# Patient Record
Sex: MALE | Race: WHITE | NOT HISPANIC OR LATINO | URBAN - METROPOLITAN AREA
[De-identification: names, ages, dates, MRNs, and addresses within clinical notes are randomized per-mention and may not be internally consistent; named-entity substitution may affect disease eponyms.]

---
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
## 2022-01-23 ENCOUNTER — HOSPITAL ENCOUNTER (EMERGENCY)
Facility: MEDICAL CENTER | Age: 22
End: 2022-01-23
Payer: COMMERCIAL

## 2025-01-23 ENCOUNTER — HOSPITAL ENCOUNTER (OUTPATIENT)
Dept: RADIOLOGY | Facility: MEDICAL CENTER | Age: 25
End: 2025-01-23
Attending: FAMILY MEDICINE
Payer: COMMERCIAL

## 2025-01-23 ENCOUNTER — OFFICE VISIT (OUTPATIENT)
Dept: URGENT CARE | Facility: PHYSICIAN GROUP | Age: 25
End: 2025-01-23
Payer: COMMERCIAL

## 2025-01-23 VITALS
SYSTOLIC BLOOD PRESSURE: 118 MMHG | OXYGEN SATURATION: 98 % | BODY MASS INDEX: 29.56 KG/M2 | HEIGHT: 66 IN | HEART RATE: 106 BPM | TEMPERATURE: 98.9 F | DIASTOLIC BLOOD PRESSURE: 84 MMHG | RESPIRATION RATE: 16 BRPM | WEIGHT: 183.9 LBS

## 2025-01-23 DIAGNOSIS — S43.101A SEPARATION OF RIGHT ACROMIOCLAVICULAR JOINT, INITIAL ENCOUNTER: ICD-10-CM

## 2025-01-23 DIAGNOSIS — S43.401A SPRAIN OF RIGHT SHOULDER, UNSPECIFIED SHOULDER SPRAIN TYPE, INITIAL ENCOUNTER: ICD-10-CM

## 2025-01-23 PROCEDURE — 3079F DIAST BP 80-89 MM HG: CPT | Performed by: FAMILY MEDICINE

## 2025-01-23 PROCEDURE — 73030 X-RAY EXAM OF SHOULDER: CPT | Mod: RT

## 2025-01-23 PROCEDURE — 3074F SYST BP LT 130 MM HG: CPT | Performed by: FAMILY MEDICINE

## 2025-01-23 PROCEDURE — 99203 OFFICE O/P NEW LOW 30 MIN: CPT | Performed by: FAMILY MEDICINE

## 2025-01-23 PROCEDURE — 73000 X-RAY EXAM OF COLLAR BONE: CPT | Mod: RT

## 2025-01-23 ASSESSMENT — ENCOUNTER SYMPTOMS
SPEECH CHANGE: 0
NUMBNESS: 0
COUGH: 0
WEAKNESS: 0
MUSCLE WEAKNESS: 0
VOMITING: 0
FEVER: 0
CHILLS: 0
MYALGIAS: 0
TINGLING: 0
SORE THROAT: 0
NAUSEA: 0
SHORTNESS OF BREATH: 0

## 2025-01-23 ASSESSMENT — VISUAL ACUITY: OU: 1

## 2025-01-24 NOTE — PROGRESS NOTES
DOT subjective:   Pawel Saeed is a 24 y.o. male who presents for Shoulder Injury (Tried to back flip on skies, lump on right shoulder and head, reduced ROM, swelling /X today 2 hours ago)        24-year-old presents to urgent urgent care with reports of right-sided shoulder pain and swelling, onset today when skiing in the terrain park in [facility] and performing a back flip and midway through the maneuver recognized under rotation and landed directly onto the hands and head.  Denies loss of consciousness, denies retrograde amnesia, no anterograde amnesia, no nausea or vomiting, no visual changes.  Reports right-sided shoulder pain and swelling.  No numbness tingling or weakness in the hands upper extremity otherwise.    Shoulder Injury   The incident occurred at the park ([place]ki area, [facility]). The right shoulder is affected. The incident occurred 1 to 3 hours ago. The injury mechanism was a fall and a direct blow. The quality of the pain is described as aching. The pain is mild. Pertinent negatives include no muscle weakness, numbness or tingling. Associated symptoms comments: Reports that reports the pending appointment with orthopedic surgery, Dr. Landeros with request for x-rays right shoulder. The symptoms are aggravated by palpation. He has tried rest for the symptoms.     PMH:  has a past medical history of Otitis media.  MEDS:   Current Outpatient Medications:     Lisdexamfetamine Dimesylate (VYVANSE) 40 MG Cap, Take 1 Cap by mouth. (Patient not taking: Reported on 1/23/2025), Disp: , Rfl:   ALLERGIES:   Allergies   Allergen Reactions    Cefotetan Hives    Cillins [Penicillins] Hives    Clindamycin Hives    Sulfa Drugs Hives     SURGHX: History reviewed. No pertinent surgical history.  SOCHX:  reports that he has never smoked. He does not have any smokeless tobacco history on file. He reports current alcohol use. He reports that he does not use drugs.  FH:   Family History   Family history  "unknown: Yes     Review of Systems   Constitutional:  Negative for chills and fever.   HENT:  Negative for sore throat.    Respiratory:  Negative for cough and shortness of breath.    Gastrointestinal:  Negative for nausea and vomiting.   Musculoskeletal:  Negative for myalgias.   Skin:  Negative for rash.   Neurological:  Negative for tingling, speech change, weakness and numbness.        Objective:   /84   Pulse (!) 106   Temp 37.2 °C (98.9 °F) (Temporal)   Resp 16   Ht 1.676 m (5' 6\")   Wt 83.4 kg (183 lb 14.4 oz)   SpO2 98%   BMI 29.68 kg/m²   Physical Exam  Vitals and nursing note reviewed.   Constitutional:       General: He is not in acute distress.     Appearance: He is well-developed.   HENT:      Head: Normocephalic and atraumatic.      Right Ear: External ear normal.      Left Ear: External ear normal.      Nose: Nose normal.      Mouth/Throat:      Mouth: Mucous membranes are moist.   Eyes:      General: Vision grossly intact.      Conjunctiva/sclera: Conjunctivae normal.      Pupils: Pupils are equal, round, and reactive to light.   Cardiovascular:      Rate and Rhythm: Normal rate.   Pulmonary:      Effort: Pulmonary effort is normal. No respiratory distress.      Breath sounds: Normal breath sounds.   Abdominal:      General: There is no distension.   Musculoskeletal:      Right shoulder: Deformity, tenderness and bony tenderness present. No laceration. Decreased range of motion. Normal strength (Motor strength 5 out of 5 to ). Normal pulse.   Skin:     General: Skin is warm and dry.   Neurological:      General: No focal deficit present.      Mental Status: He is alert and oriented to person, place, and time. Mental status is at baseline.      Gait: Gait (gait at baseline) normal.   Psychiatric:         Judgment: Judgment normal.     DX-SHOULDER 2+ RIGHT  Order: [id number]   Status: Final result       Visible to patient: Yes (not seen)       Dx: Sprain of right shoulder, " unspecified...    0 Result Notes  Details    Reading Physician Reading Date Result Priority   Saul Rivera M.D.  [phone] 1/23/2025      Narrative & Impression     1/23/2025 4:13 PM     HISTORY/REASON FOR EXAM:  Pain/Deformity Following Trauma.        TECHNIQUE/EXAM DESCRIPTION AND NUMBER OF VIEWS:  3 views of the RIGHT shoulder.     COMPARISON: None     FINDINGS:  There is widening of the acromioclavicular and coracoclavicular intervals.  No acute fracture.  The joint spaces are grossly preserved.  Bone mineralization is age-appropriate..        IMPRESSION:     Grade 3 right acromioclavicular separation.        Exam Ended: 01/23/25  4:23 PM Last Resulted: 01/23/25  5:02 PM             DX-SHOULDER 2+ RIGHT  Order: [id number]   Status: Final result       Visible to patient: Yes (not seen)       Next appt: None       Dx: Sprain of right shoulder, unspecified...    0 Result Notes  Details    Reading Physician Reading Date Result Priority   Saul Rivera M.D.  [phone] 1/23/2025      Narrative & Impression     1/23/2025 4:13 PM     HISTORY/REASON FOR EXAM:  Pain/Deformity Following Trauma.        TECHNIQUE/EXAM DESCRIPTION AND NUMBER OF VIEWS:  3 views of the RIGHT shoulder.     COMPARISON: None     FINDINGS:  There is widening of the acromioclavicular and coracoclavicular intervals.  No acute fracture.  The joint spaces are grossly preserved.  Bone mineralization is age-appropriate..        IMPRESSION:     Grade 3 right acromioclavicular separation.        Exam Ended: 01/23/25  4:23 PM Last Resulted: 01/23/25  5:02 PM             Assessment/Plan:   1. Separation of right acromioclavicular joint, initial encounter  - DX-SHOULDER 2+ RIGHT; Future  - DX-CLAVICLE RIGHT; Future  - Slings    2. Sprain of right shoulder, unspecified shoulder sprain type, initial encounter  - DX-SHOULDER 2+ RIGHT; Future  - DX-CLAVICLE RIGHT; Future  - Slings        Medical Decision Making/Course:  In the course of preparing for this  visit with review of the pertinent past medical history, recent and past clinic visits, current medications, and performing chart, immunization, medical history and medication reconciliation, and in the further course of obtaining the current history pertinent to the clinic visit today, performing an exam and evaluation, ordering and independently evaluating labs, tests including independent interpretation evaluation of x-ray imaging, and/or procedures including application of sling during urgent care course, prescribing any recommended new medications as noted above, providing any pertinent counseling and education and recommending further coordination of care including recommendations for symptomatic and supportive measures and recommendation to follow-up with orthopedics, Dr. Landeros, as scheduled, at least  38 minutes of total time were spent during this encounter.      Discussed close monitoring, return precautions, and supportive measures of maintaining adequate fluid hydration and caloric intake, relative rest and symptom management as needed for pain and/or fever.    Differential diagnosis, natural history, supportive care, and indications for immediate follow-up discussed.     Advised the patient to follow-up with the primary care physician for recheck, reevaluation, and consideration of further management.    Please note that this dictation was created using voice recognition software. I have worked with consultants from the vendor as well as technical experts from [facility] [employer] to optimize the interface. I have made every reasonable attempt to correct obvious errors, but I expect that there are errors of grammar and possibly content that I did not discover before finalizing the note.